# Patient Record
Sex: FEMALE | Race: WHITE | NOT HISPANIC OR LATINO
[De-identification: names, ages, dates, MRNs, and addresses within clinical notes are randomized per-mention and may not be internally consistent; named-entity substitution may affect disease eponyms.]

---

## 2018-02-20 ENCOUNTER — TRANSCRIPTION ENCOUNTER (OUTPATIENT)
Age: 43
End: 2018-02-20

## 2019-11-21 ENCOUNTER — TRANSCRIPTION ENCOUNTER (OUTPATIENT)
Age: 44
End: 2019-11-21

## 2023-03-30 ENCOUNTER — NON-APPOINTMENT (OUTPATIENT)
Age: 48
End: 2023-03-30

## 2023-04-03 ENCOUNTER — APPOINTMENT (OUTPATIENT)
Dept: BREAST CENTER | Facility: CLINIC | Age: 48
End: 2023-04-03
Payer: COMMERCIAL

## 2023-04-03 VITALS
SYSTOLIC BLOOD PRESSURE: 131 MMHG | HEIGHT: 64 IN | WEIGHT: 135 LBS | BODY MASS INDEX: 23.05 KG/M2 | DIASTOLIC BLOOD PRESSURE: 94 MMHG | HEART RATE: 114 BPM

## 2023-04-03 DIAGNOSIS — Z80.0 FAMILY HISTORY OF MALIGNANT NEOPLASM OF DIGESTIVE ORGANS: ICD-10-CM

## 2023-04-03 DIAGNOSIS — Z82.49 FAMILY HISTORY OF ISCHEMIC HEART DISEASE AND OTHER DISEASES OF THE CIRCULATORY SYSTEM: ICD-10-CM

## 2023-04-03 DIAGNOSIS — Z63.5 DISRUPTION OF FAMILY BY SEPARATION AND DIVORCE: ICD-10-CM

## 2023-04-03 DIAGNOSIS — Z82.3 FAMILY HISTORY OF STROKE: ICD-10-CM

## 2023-04-03 DIAGNOSIS — Z82.0 FAMILY HISTORY OF EPILEPSY AND OTHER DISEASES OF THE NERVOUS SYSTEM: ICD-10-CM

## 2023-04-03 DIAGNOSIS — Z98.82 BREAST IMPLANT STATUS: ICD-10-CM

## 2023-04-03 DIAGNOSIS — Z78.9 OTHER SPECIFIED HEALTH STATUS: ICD-10-CM

## 2023-04-03 PROCEDURE — 99204 OFFICE O/P NEW MOD 45 MIN: CPT

## 2023-04-03 SDOH — SOCIAL STABILITY - SOCIAL INSECURITY: DISRUPTION OF FAMILY BY SEPARATION AND DIVORCE: Z63.5

## 2023-04-03 NOTE — HISTORY OF PRESENT ILLNESS
[FreeTextEntry1] : Ms. Morris is a 48 year old woman who presents for a consultation for a rash on the right mastectomy flap, and surveillance for breast cancer. 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 William) with implant based reconstruction (Dr. Stratton) = R breast DCIS, <0.1 cm from the deep margin, 0/1 R sln\par \par 2.) Recurrence of R breast DCIS in 2016 at age 41\par - s/p excision of R mastectomy flap (9/8/16) = DCIS, intermediate to high grade (2 and 4 mm), < 0.1 cm of inked margin from superior flap tissue\par - s/p radiation ([facility])\par - after 2 opinions, no anti-hormonal medication was recommended\par \par A few weeks ago, she developed a rash that was red, tender, with blisters on the right mastectomy flap. She saw a Dermatologist Dr. Hong and is being treated with a 10 day course of Augmentin with improvement of her symptoms. No lumps. It has been a difficult few years and she is now catching up with her doctor visits.\par \par Her BRCA testing is negative. Her family history is not significant for any breast cancer.

## 2023-04-03 NOTE — PAST MEDICAL HISTORY
[Menarche Age ____] : age at menarche was [unfilled] [Approximately ___] : the LMP was approximately [unfilled] [Total Preg ___] : G[unfilled] [Live Births ___] : P[unfilled]  [Age At Live Birth ___] : Age at live birth: [unfilled] [Perimenopausal] : The patient is perimenopausal [History of Hormone Replacement Treatment] : has no history of hormone replacement treatment [FreeTextEntry7] : Five years [FreeTextEntry8] : Ten months

## 2023-04-03 NOTE — PHYSICAL EXAM
[Normocephalic] : normocephalic [Atraumatic] : atraumatic [Sclera nonicteric] : sclera nonicteric [Supple] : supple [No Supraclavicular Adenopathy] : no supraclavicular adenopathy [No Cervical Adenopathy] : no cervical adenopathy [Clear to Auscultation Bilat] : clear to auscultation bilaterally [Normal Sinus Rhythm] : normal sinus rhythm [Examined in the supine and seated position] : examined in the supine and seated position [Asymmetrical] : asymmetrical [No dominant masses] : no dominant masses in right breast  [No dominant masses] : no dominant masses left breast [No Axillary Lymphadenopathy] : no left axillary lymphadenopathy [Soft] : abdomen soft [No Edema] : no edema [No Rashes] : no rashes [No Ulceration] : no ulceration [de-identified] : R [de-identified] : Transverse scar. Implant with contracture. Several scabs and skin lesions; no drainage. No lumps [de-identified] : Transverse scar. Implant

## 2023-04-03 NOTE — CONSULT LETTER
[Dear  ___] : Dear  [unfilled], [Consult Letter:] : I had the pleasure of evaluating your patient, [unfilled]. [Please see my note below.] : Please see my note below. [Consult Closing:] : Thank you very much for allowing me to participate in the care of this patient.  If you have any questions, please do not hesitate to contact me. [Sincerely,] : Sincerely, [FreeTextEntry3] : Faith Estrada MD FACS

## 2023-04-26 ENCOUNTER — NON-APPOINTMENT (OUTPATIENT)
Age: 48
End: 2023-04-26

## 2023-05-01 ENCOUNTER — APPOINTMENT (OUTPATIENT)
Dept: MRI IMAGING | Facility: CLINIC | Age: 48
End: 2023-05-01
Payer: COMMERCIAL

## 2023-05-01 ENCOUNTER — OUTPATIENT (OUTPATIENT)
Dept: OUTPATIENT SERVICES | Facility: HOSPITAL | Age: 48
LOS: 1 days | End: 2023-05-01
Payer: COMMERCIAL

## 2023-05-01 DIAGNOSIS — N64.59 OTHER SIGNS AND SYMPTOMS IN BREAST: ICD-10-CM

## 2023-05-01 DIAGNOSIS — Z98.82 BREAST IMPLANT STATUS: ICD-10-CM

## 2023-05-01 PROCEDURE — C8908: CPT

## 2023-05-01 PROCEDURE — A9585: CPT

## 2023-05-01 PROCEDURE — 77049 MRI BREAST C-+ W/CAD BI: CPT | Mod: 26

## 2023-05-01 PROCEDURE — C8937: CPT

## 2023-05-02 ENCOUNTER — TRANSCRIPTION ENCOUNTER (OUTPATIENT)
Age: 48
End: 2023-05-02

## 2023-05-09 ENCOUNTER — LABORATORY RESULT (OUTPATIENT)
Age: 48
End: 2023-05-09

## 2023-05-09 ENCOUNTER — APPOINTMENT (OUTPATIENT)
Dept: BREAST CENTER | Facility: CLINIC | Age: 48
End: 2023-05-09
Payer: COMMERCIAL

## 2023-05-09 ENCOUNTER — RESULT REVIEW (OUTPATIENT)
Age: 48
End: 2023-05-09

## 2023-05-09 VITALS
DIASTOLIC BLOOD PRESSURE: 92 MMHG | WEIGHT: 135 LBS | HEART RATE: 118 BPM | BODY MASS INDEX: 23.05 KG/M2 | SYSTOLIC BLOOD PRESSURE: 138 MMHG | HEIGHT: 64 IN

## 2023-05-09 DIAGNOSIS — R92.8 OTHER ABNORMAL AND INCONCLUSIVE FINDINGS ON DIAGNOSTIC IMAGING OF BREAST: ICD-10-CM

## 2023-05-09 PROCEDURE — 11104 PUNCH BX SKIN SINGLE LESION: CPT

## 2023-05-09 RX ORDER — MUPIROCIN 20 MG/G
2 OINTMENT TOPICAL
Refills: 0 | Status: DISCONTINUED | COMMUNITY
End: 2023-05-09

## 2023-05-09 RX ORDER — AMOXICILLIN AND CLAVULANATE POTASSIUM 500; 125 MG/1; 1/1
500-125 TABLET, FILM COATED ORAL
Refills: 0 | Status: DISCONTINUED | COMMUNITY
End: 2023-05-09

## 2023-05-09 RX ORDER — AZELAIC ACID 0.15 G/G
15 GEL TOPICAL
Refills: 0 | Status: DISCONTINUED | COMMUNITY
End: 2023-05-09

## 2023-05-09 NOTE — PROCEDURE
[FreeTextEntry1] : Punch biopsy of right mastectomy flap [FreeTextEntry2] : Persistent rash on right mastectomy flap with abnormal enhancement on MRI [FreeTextEntry3] : The procedure was reviewed along with the benefits and risks which include damage to the underlying implant. Consent was obtained. The patient was prepped with betadine and sterilely draped. 1% lidocaine was injected for local anesthesia to a lesion at 9:00 where the mastectomy flap was thickest. The mastectomy flap was pinched to lift it from the implant, and a 3 mm punch was used to obtain a circular piece of skin. This was placed in formalin and sent to pathology. The flap was again pinched to place a single 4-0 nylon suture for wound closure. Hemostasis was ensured. The site was dressed with a sterile gauze and a tegaderm. The patient tolerated the procedure.\par \par I will call her when the results are back.

## 2023-05-10 ENCOUNTER — APPOINTMENT (OUTPATIENT)
Dept: MRI IMAGING | Facility: CLINIC | Age: 48
End: 2023-05-10
Payer: COMMERCIAL

## 2023-05-10 ENCOUNTER — OUTPATIENT (OUTPATIENT)
Dept: OUTPATIENT SERVICES | Facility: HOSPITAL | Age: 48
LOS: 1 days | End: 2023-05-10
Payer: COMMERCIAL

## 2023-05-10 DIAGNOSIS — R92.8 OTHER ABNORMAL AND INCONCLUSIVE FINDINGS ON DIAGNOSTIC IMAGING OF BREAST: ICD-10-CM

## 2023-05-10 PROCEDURE — 72197 MRI PELVIS W/O & W/DYE: CPT | Mod: 26

## 2023-05-10 PROCEDURE — A9585: CPT

## 2023-05-10 PROCEDURE — 74183 MRI ABD W/O CNTR FLWD CNTR: CPT | Mod: 26

## 2023-05-10 PROCEDURE — 74183 MRI ABD W/O CNTR FLWD CNTR: CPT

## 2023-05-10 PROCEDURE — 72197 MRI PELVIS W/O & W/DYE: CPT

## 2023-05-11 ENCOUNTER — OUTPATIENT (OUTPATIENT)
Dept: OUTPATIENT SERVICES | Facility: HOSPITAL | Age: 48
LOS: 1 days | Discharge: HOME | End: 2023-05-11
Payer: COMMERCIAL

## 2023-05-11 DIAGNOSIS — C50.919 MALIGNANT NEOPLASM OF UNSPECIFIED SITE OF UNSPECIFIED FEMALE BREAST: ICD-10-CM

## 2023-05-12 ENCOUNTER — APPOINTMENT (OUTPATIENT)
Dept: HEMATOLOGY ONCOLOGY | Facility: CLINIC | Age: 48
End: 2023-05-12
Payer: COMMERCIAL

## 2023-05-12 ENCOUNTER — RESULT REVIEW (OUTPATIENT)
Age: 48
End: 2023-05-12

## 2023-05-12 ENCOUNTER — NON-APPOINTMENT (OUTPATIENT)
Age: 48
End: 2023-05-12

## 2023-05-12 VITALS
HEART RATE: 122 BPM | WEIGHT: 132.28 LBS | RESPIRATION RATE: 120 BRPM | SYSTOLIC BLOOD PRESSURE: 141 MMHG | HEIGHT: 62.01 IN | BODY MASS INDEX: 24.03 KG/M2 | TEMPERATURE: 97.2 F | OXYGEN SATURATION: 95 % | DIASTOLIC BLOOD PRESSURE: 92 MMHG

## 2023-05-12 DIAGNOSIS — Z08 ENCOUNTER FOR FOLLOW-UP EXAMINATION AFTER COMPLETED TREATMENT FOR MALIGNANT NEOPLASM: ICD-10-CM

## 2023-05-12 DIAGNOSIS — Z78.9 OTHER SPECIFIED HEALTH STATUS: ICD-10-CM

## 2023-05-12 DIAGNOSIS — Z85.3 PERSONAL HISTORY OF MALIGNANT NEOPLASM OF BREAST: ICD-10-CM

## 2023-05-12 DIAGNOSIS — F41.8 OTHER SPECIFIED ANXIETY DISORDERS: ICD-10-CM

## 2023-05-12 DIAGNOSIS — Z85.3 ENCOUNTER FOR FOLLOW-UP EXAMINATION AFTER COMPLETED TREATMENT FOR MALIGNANT NEOPLASM: ICD-10-CM

## 2023-05-12 LAB
ALBUMIN SERPL ELPH-MCNC: 4.8 G/DL
ALP BLD-CCNC: 145 U/L
ALT SERPL-CCNC: 14 U/L
ANION GAP SERPL CALC-SCNC: 19 MMOL/L
AST SERPL-CCNC: 40 U/L
BASOPHILS # BLD AUTO: 0.06 K/UL — SIGNIFICANT CHANGE UP (ref 0–0.2)
BASOPHILS NFR BLD AUTO: 0.8 % — SIGNIFICANT CHANGE UP (ref 0–2)
BILIRUB SERPL-MCNC: 0.4 MG/DL
BUN SERPL-MCNC: 10 MG/DL
CALCIUM SERPL-MCNC: 9.9 MG/DL
CANCER AG15-3 SERPL-ACNC: 430 U/ML
CEA SERPL-MCNC: 9.8 NG/ML
CHLORIDE SERPL-SCNC: 98 MMOL/L
CO2 SERPL-SCNC: 22 MMOL/L
CREAT SERPL-MCNC: 0.69 MG/DL
EGFR: 106 ML/MIN/1.73M2
EOSINOPHIL # BLD AUTO: 0.02 K/UL — SIGNIFICANT CHANGE UP (ref 0–0.5)
EOSINOPHIL NFR BLD AUTO: 0.3 % — SIGNIFICANT CHANGE UP (ref 0–6)
ESTRADIOL SERPL-MCNC: 100 PG/ML
FSH SERPL-MCNC: 21.2 IU/L
GLUCOSE SERPL-MCNC: 94 MG/DL
HCT VFR BLD CALC: 39.8 % — SIGNIFICANT CHANGE UP (ref 34.5–45)
HGB BLD-MCNC: 13.1 G/DL — SIGNIFICANT CHANGE UP (ref 11.5–15.5)
IMM GRANULOCYTES NFR BLD AUTO: 0.4 % — SIGNIFICANT CHANGE UP (ref 0–0.9)
INR PPP: 1.11 RATIO
LYMPHOCYTES # BLD AUTO: 0.94 K/UL — LOW (ref 1–3.3)
LYMPHOCYTES # BLD AUTO: 12.1 % — LOW (ref 13–44)
MCHC RBC-ENTMCNC: 27.9 PG — SIGNIFICANT CHANGE UP (ref 27–34)
MCHC RBC-ENTMCNC: 32.9 G/DL — SIGNIFICANT CHANGE UP (ref 32–36)
MCV RBC AUTO: 84.9 FL — SIGNIFICANT CHANGE UP (ref 80–100)
MONOCYTES # BLD AUTO: 0.49 K/UL — SIGNIFICANT CHANGE UP (ref 0–0.9)
MONOCYTES NFR BLD AUTO: 6.3 % — SIGNIFICANT CHANGE UP (ref 2–14)
NEUTROPHILS # BLD AUTO: 6.21 K/UL — SIGNIFICANT CHANGE UP (ref 1.8–7.4)
NEUTROPHILS NFR BLD AUTO: 80.1 % — HIGH (ref 43–77)
NRBC # BLD: 0 /100 WBCS — SIGNIFICANT CHANGE UP (ref 0–0)
PLATELET # BLD AUTO: 223 K/UL — SIGNIFICANT CHANGE UP (ref 150–400)
POTASSIUM SERPL-SCNC: 3.9 MMOL/L
PROT SERPL-MCNC: 7.7 G/DL
PT BLD: 12.9 SEC
RBC # BLD: 4.69 M/UL — SIGNIFICANT CHANGE UP (ref 3.8–5.2)
RBC # FLD: 12.6 % — SIGNIFICANT CHANGE UP (ref 10.3–14.5)
SODIUM SERPL-SCNC: 139 MMOL/L
WBC # BLD: 7.75 K/UL — SIGNIFICANT CHANGE UP (ref 3.8–10.5)
WBC # FLD AUTO: 7.75 K/UL — SIGNIFICANT CHANGE UP (ref 3.8–10.5)

## 2023-05-12 PROCEDURE — 93010 ELECTROCARDIOGRAM REPORT: CPT

## 2023-05-12 PROCEDURE — 99245 OFF/OP CONSLTJ NEW/EST HI 55: CPT

## 2023-05-12 RX ORDER — ALPRAZOLAM 0.25 MG/1
0.25 TABLET ORAL
Qty: 30 | Refills: 0 | Status: ACTIVE | COMMUNITY
Start: 2023-05-12 | End: 1900-01-01

## 2023-05-12 NOTE — CONSULT LETTER
[Consult Letter:] : I had the pleasure of evaluating your patient, [unfilled]. [Please see my note below.] : Please see my note below. [Consult Closing:] : Thank you very much for allowing me to participate in the care of this patient.  If you have any questions, please do not hesitate to contact me. [Sincerely,] : Sincerely, [DrAna Lilia  ___] : Dr. VILA [DrAna Lilia ___] : Dr. VILA [FreeTextEntry2] : Faith Estrada MD\par [address]\par [place] [phone] [FreeTextEntry3] : Niesha Barnhart MD\par Associate Chief \par [facility]\par [facility]\par  of Medicine\par [employer] [facility] [employer]\par \par

## 2023-05-12 NOTE — PHYSICAL EXAM
[Fully active, able to carry on all pre-disease performance without restriction] : Status 0 - Fully active, able to carry on all pre-disease performance without restriction [Normal] : affect appropriate [de-identified] : s/p Bilateral mastectomies with implant reconstruction. No chest wall masses or lymphadenopathy. Healing skin lesions with no discrete masses

## 2023-05-12 NOTE — REASON FOR VISIT
[Initial Consultation] : an initial consultation [Friend] : friend [Spouse] : spouse [FreeTextEntry2] : Metastatic breast cancer

## 2023-05-12 NOTE — HISTORY OF PRESENT ILLNESS
[Disease: _____________________] : Disease: [unfilled] [AJCC Stage: ____] : AJCC Stage: [unfilled] [T: ___] : T[unfilled] [N: ___] : N[unfilled] [M: ___] : M[unfilled] [de-identified] : Metastatic breast cancer at age 48.\par Right DCIS diagnosed at age 37 with chest wall recurrence at age 41.\par 3/9/12 Right breast biopsy revealed DCIS, low nuclear grade, ER/[place] >90%.\par 4/12/12 Bilateral mastectomies with SLN by Dr. Carla William at [facility] revealed DCIS, intermediate to high nuclear grade, with DCIS present <0.1 cm from the deep margin, 0/3 left SLN and 0/1 right SLN. She had immediate implant based reconstruction by Dr. Stratton.\par 9/8/16 Resection of Right chest wall focal high grade DCIS (2 and 4 mm) recurrence with negative margins, although <0.1 cm from inked margin of superior flap tissue\par 10/2016 Adjuvant chest wall radiation at [facility].\par 3/23 She developed a rash with blisters on the right mastectomy flap. She was treated with a 10 day course of Augmentin with improvement.\par 4/3/23 Genetic testing with [employer] 47 gene panel negative for pathogenic variants and VUS.\par 5/1/23 MRI breasts revealed multiple foci of dermal enhancement in the right lateral chest wall corresponding to the area of clinical concern. Innumerable hepatic lesions.\par 5/9/23 Biopsy of chest wall lesion by Dr. Estrada with pathology pending\par 5/10/23 MRI of the abdomen revealed extensive liver metastases and bone metastases in the lumbar spine, pelvis and proximal femurs as well as a left adrenal lesion, consistent with metastatic disease. [de-identified] : 2012: DCIS, high grade, ER/[place] >90% [de-identified] : Fatimah comes with her close friend to discuss the medical management of her newly diagnosed metastatic breast cancer. The chest wall lesions improved on Augmentin but recently she has been having a lot of headaches and intermittent pain in her back and pelvis. She is also very anxious and is not sleeping well and has lost 5 pounds this week.\par She is  with two children ages 17 and 15. She and her ex  Dc are still close friends and he accompanies her today along with her friend.\par \par HEALTH MAINTENANCE:\par PCP: CARLOS Pedro with Dr. Modesto Piper\par GYN: Dr. Philip Schoenfeld\par Mammogram: s/p bilateral mastectomies\par Pap Smear: 4/23 negative\par Colonoscopy: none\par Genetics: 4/23 [employer] 47 gene panel negative for pathogenic variants and VUS.\par

## 2023-05-13 ENCOUNTER — APPOINTMENT (OUTPATIENT)
Dept: MRI IMAGING | Facility: CLINIC | Age: 48
End: 2023-05-13
Payer: COMMERCIAL

## 2023-05-13 ENCOUNTER — OUTPATIENT (OUTPATIENT)
Dept: OUTPATIENT SERVICES | Facility: HOSPITAL | Age: 48
LOS: 1 days | End: 2023-05-13
Payer: COMMERCIAL

## 2023-05-13 DIAGNOSIS — C50.919 MALIGNANT NEOPLASM OF UNSPECIFIED SITE OF UNSPECIFIED FEMALE BREAST: ICD-10-CM

## 2023-05-13 PROCEDURE — A9585: CPT

## 2023-05-13 PROCEDURE — 70553 MRI BRAIN STEM W/O & W/DYE: CPT | Mod: 26

## 2023-05-13 PROCEDURE — 70553 MRI BRAIN STEM W/O & W/DYE: CPT

## 2023-05-15 ENCOUNTER — APPOINTMENT (OUTPATIENT)
Dept: NUCLEAR MEDICINE | Facility: IMAGING CENTER | Age: 48
End: 2023-05-15
Payer: COMMERCIAL

## 2023-05-15 ENCOUNTER — APPOINTMENT (OUTPATIENT)
Dept: HEMATOLOGY ONCOLOGY | Facility: CLINIC | Age: 48
End: 2023-05-15

## 2023-05-15 ENCOUNTER — OUTPATIENT (OUTPATIENT)
Dept: OUTPATIENT SERVICES | Facility: HOSPITAL | Age: 48
LOS: 1 days | End: 2023-05-15
Payer: COMMERCIAL

## 2023-05-15 ENCOUNTER — RESULT REVIEW (OUTPATIENT)
Age: 48
End: 2023-05-15

## 2023-05-15 ENCOUNTER — LABORATORY RESULT (OUTPATIENT)
Age: 48
End: 2023-05-15

## 2023-05-15 DIAGNOSIS — C50.919 MALIGNANT NEOPLASM OF UNSPECIFIED SITE OF UNSPECIFIED FEMALE BREAST: ICD-10-CM

## 2023-05-15 LAB — CANCER AG27-29 SERPL-ACNC: 292.2 U/ML

## 2023-05-15 PROCEDURE — 78306 BONE IMAGING WHOLE BODY: CPT | Mod: 26

## 2023-05-15 PROCEDURE — 78830 RP LOCLZJ TUM SPECT W/CT 1: CPT | Mod: 26

## 2023-05-15 PROCEDURE — 78306 BONE IMAGING WHOLE BODY: CPT

## 2023-05-15 PROCEDURE — 78830 RP LOCLZJ TUM SPECT W/CT 1: CPT

## 2023-05-15 PROCEDURE — A9561: CPT

## 2023-05-16 ENCOUNTER — RESULT REVIEW (OUTPATIENT)
Age: 48
End: 2023-05-16

## 2023-05-16 ENCOUNTER — OUTPATIENT (OUTPATIENT)
Dept: OUTPATIENT SERVICES | Facility: HOSPITAL | Age: 48
LOS: 1 days | End: 2023-05-16
Payer: COMMERCIAL

## 2023-05-16 ENCOUNTER — APPOINTMENT (OUTPATIENT)
Dept: ULTRASOUND IMAGING | Facility: IMAGING CENTER | Age: 48
End: 2023-05-16
Payer: COMMERCIAL

## 2023-05-16 DIAGNOSIS — Z00.8 ENCOUNTER FOR OTHER GENERAL EXAMINATION: ICD-10-CM

## 2023-05-16 DIAGNOSIS — C50.919 MALIGNANT NEOPLASM OF UNSPECIFIED SITE OF UNSPECIFIED FEMALE BREAST: ICD-10-CM

## 2023-05-16 PROCEDURE — 88360 TUMOR IMMUNOHISTOCHEM/MANUAL: CPT | Mod: 26

## 2023-05-16 PROCEDURE — 88307 TISSUE EXAM BY PATHOLOGIST: CPT | Mod: 26

## 2023-05-16 PROCEDURE — 88360 TUMOR IMMUNOHISTOCHEM/MANUAL: CPT

## 2023-05-16 PROCEDURE — 88307 TISSUE EXAM BY PATHOLOGIST: CPT

## 2023-05-16 PROCEDURE — 76942 ECHO GUIDE FOR BIOPSY: CPT | Mod: 26

## 2023-05-16 PROCEDURE — 47000 NEEDLE BIOPSY OF LIVER PERQ: CPT

## 2023-05-16 PROCEDURE — 76942 ECHO GUIDE FOR BIOPSY: CPT

## 2023-05-16 PROCEDURE — 88342 IMHCHEM/IMCYTCHM 1ST ANTB: CPT | Mod: 26,59

## 2023-05-16 PROCEDURE — 88341 IMHCHEM/IMCYTCHM EA ADD ANTB: CPT

## 2023-05-16 RX ORDER — CELECOXIB 100 MG/1
100 CAPSULE ORAL
Qty: 30 | Refills: 5 | Status: DISCONTINUED | COMMUNITY
Start: 2023-05-12 | End: 2023-05-16

## 2023-05-18 ENCOUNTER — APPOINTMENT (OUTPATIENT)
Dept: INFUSION THERAPY | Facility: HOSPITAL | Age: 48
End: 2023-05-18

## 2023-05-18 ENCOUNTER — NON-APPOINTMENT (OUTPATIENT)
Age: 48
End: 2023-05-18

## 2023-05-18 DIAGNOSIS — C79.51 SECONDARY MALIGNANT NEOPLASM OF BONE: ICD-10-CM

## 2023-05-19 ENCOUNTER — APPOINTMENT (OUTPATIENT)
Dept: BREAST CENTER | Facility: CLINIC | Age: 48
End: 2023-05-19
Payer: COMMERCIAL

## 2023-05-19 VITALS
HEART RATE: 89 BPM | SYSTOLIC BLOOD PRESSURE: 115 MMHG | DIASTOLIC BLOOD PRESSURE: 83 MMHG | HEIGHT: 64 IN | BODY MASS INDEX: 23.05 KG/M2 | WEIGHT: 135 LBS

## 2023-05-19 LAB — SURGICAL PATHOLOGY STUDY: SIGNIFICANT CHANGE UP

## 2023-05-19 PROCEDURE — 99211 OFF/OP EST MAY X REQ PHY/QHP: CPT

## 2023-05-19 NOTE — HISTORY OF PRESENT ILLNESS
[FreeTextEntry1] : This is a 48 year old woman who is ten days s/p punch biopsy of right mastectomy flap (Dr. Estrada).  She is here for stitch removal.  She denies any fevers, chills, redness  or other complaints at the site of biopsy.  She underwent a liver biopsy three days ago and is awaiting results.

## 2023-05-19 NOTE — DATA REVIEWED
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 Estrada  over the phone on 5/18/23).

## 2023-05-19 NOTE — PHYSICAL EXAM
[de-identified] : nylon stitch in place @ 9:00 - stitch removed without difficulty . Area without erythema.  bacitracin placed to site

## 2023-05-23 PROBLEM — G89.3 CANCER ASSOCIATED PAIN: Status: RESOLVED | Noted: 2023-05-12 | Resolved: 2023-05-23

## 2023-05-23 RX ORDER — LEUPROLIDE ACETATE 11.25 MG
KIT INTRAMUSCULAR
Refills: 0 | Status: ACTIVE | COMMUNITY

## 2023-05-24 ENCOUNTER — OUTPATIENT (OUTPATIENT)
Dept: OUTPATIENT SERVICES | Facility: HOSPITAL | Age: 48
LOS: 1 days | End: 2023-05-24
Payer: COMMERCIAL

## 2023-05-24 ENCOUNTER — APPOINTMENT (OUTPATIENT)
Dept: NUCLEAR MEDICINE | Facility: CLINIC | Age: 48
End: 2023-05-24
Payer: COMMERCIAL

## 2023-05-24 ENCOUNTER — APPOINTMENT (OUTPATIENT)
Dept: HEMATOLOGY ONCOLOGY | Facility: CLINIC | Age: 48
End: 2023-05-24
Payer: COMMERCIAL

## 2023-05-24 VITALS
BODY MASS INDEX: 22.82 KG/M2 | WEIGHT: 132.94 LBS | HEART RATE: 93 BPM | DIASTOLIC BLOOD PRESSURE: 82 MMHG | OXYGEN SATURATION: 96 % | SYSTOLIC BLOOD PRESSURE: 123 MMHG | TEMPERATURE: 97.2 F | RESPIRATION RATE: 17 BRPM

## 2023-05-24 DIAGNOSIS — G89.3 NEOPLASM RELATED PAIN (ACUTE) (CHRONIC): ICD-10-CM

## 2023-05-24 DIAGNOSIS — N64.59 OTHER SIGNS AND SYMPTOMS IN BREAST: ICD-10-CM

## 2023-05-24 DIAGNOSIS — Z85.3 PERSONAL HISTORY OF MALIGNANT NEOPLASM OF BREAST: ICD-10-CM

## 2023-05-24 DIAGNOSIS — R51.9 HEADACHE, UNSPECIFIED: ICD-10-CM

## 2023-05-24 PROCEDURE — 78815 PET IMAGE W/CT SKULL-THIGH: CPT

## 2023-05-24 PROCEDURE — A9552: CPT

## 2023-05-24 PROCEDURE — 78815 PET IMAGE W/CT SKULL-THIGH: CPT | Mod: 26,PI

## 2023-05-24 PROCEDURE — 99215 OFFICE O/P EST HI 40 MIN: CPT

## 2023-06-05 ENCOUNTER — LABORATORY RESULT (OUTPATIENT)
Age: 48
End: 2023-06-05

## 2023-06-05 NOTE — HISTORY OF PRESENT ILLNESS
[Disease: _____________________] : Disease: [unfilled] [T: ___] : T[unfilled] [N: ___] : N[unfilled] [M: ___] : M[unfilled] [AJCC Stage: ____] : AJCC Stage: [unfilled] [de-identified] : Metastatic breast cancer at age 48.\par Right DCIS diagnosed at age 37 with chest wall recurrence at age 41.\par 3/9/12 Right breast biopsy revealed DCIS, low nuclear grade, ER/[place] >90%.\par 4/12/12 Bilateral mastectomies with SLN by Dr. Carla William at [facility] revealed DCIS, intermediate to high nuclear grade, with DCIS present <0.1 cm from the deep margin, 0/3 left SLN and 0/1 right SLN. She had immediate implant based reconstruction by Dr. Stratton.\par 9/8/16 Resection of Right chest wall focal high grade DCIS (2 and 4 mm) recurrence with negative margins, although <0.1 cm from inked margin of superior flap tissue\par 10/2016 Adjuvant chest wall radiation at [facility].\par 3/23 She developed a rash with blisters on the right mastectomy flap. She was treated with a 10 day course of Augmentin with improvement.\par 4/3/23 Genetic testing with [employer] 47 gene panel negative for pathogenic variants and VUS.\par 5/1/23 MRI breasts revealed multiple foci of dermal enhancement in the right lateral chest wall corresponding to the area of clinical concern. Innumerable hepatic lesions.\par 5/9/23 Biopsy of chest wall lesion by Dr. Estrada showed no carcinoma and only dermal sclerosis with superficial fibroblasts and scattered telangiectases consistent with radiation dermatitis changes.\par 5/10/23 MRI of the abdomen revealed extensive liver metastases and bone metastases in the lumbar spine, pelvis and proximal femurs as well as a left adrenal lesion, consistent with metastatic disease.\par 5/13/23 MRI brain showed no abnormal enhancement and no abnormal findings\par 5/15/23 Bone scan showed focal increased uptake in the right acetabulum and left iliac wing suggestive of osseous metastases and punctate foci in the left parietal calvarium and left side of T10 vertebra which are nonspecific, but possibly represent additional metastatic foci.\par 05/16/23 US guided Liver biopsy revealed metastatic carcinoma, morphologically and immunophenotypically compatible with breast primary, ER 90%, [place] 80%, HER-2 (1+)\par 5/18/23 Lupron 11.75 mg given and will continue every 3 months\par 5/18/23 Xgeva 120 mg given and will continue  every 3 months\par 5/23 Letrozole and Ibrance 125 mg po daily days 1-21 every 28 days [de-identified] : 2023 IDC, ER 90%, [place] 80%, HER-2 (1+); 2012: DCIS, high grade, ER/[place] >90% [de-identified] : Fatimah started treatment on 5/18 with Lupron and Xgeva. She tolerated both well with some mild hot flashes and no increase in pain.\par She had a liver biopsy on 5/16/23 which showed IDC, ER 90%, [place] 80%, HER-2 negative.\par She had her MRI brain on 5/13 which was negative and bone scan on 5/12 which showed limited metastatic disease. She is scheduled for PET scan on 5/24/23.\par The chest wall lesions improved on Augmentin and the punch biopsy showed no carcinoma and only dermal sclerosis with superficial fibroblasts and scattered telangiectases consistent with radiation dermatitis changes.\par She continues to have aches and pains. She often takes ibuprofen 800 mg but that is usually for her headaches which occur every few days.\par \par HEALTH MAINTENANCE:\par PCP: CARLOS Pedro with Dr. Modesto Piper\par GYN: Dr. Philip Schoenfeld\par Mammogram: s/p bilateral mastectomies\par Pap Smear: 4/23 negative\par Colonoscopy: none\par Genetics: 4/23 [employer]ae 47 gene panel negative for pathogenic variants and VUS.\par

## 2023-06-05 NOTE — REASON FOR VISIT
[Spouse] : spouse [Friend] : friend [Follow-Up Visit] : a follow-up [FreeTextEntry2] : Metastatic breast cancer

## 2023-06-05 NOTE — PHYSICAL EXAM
[Fully active, able to carry on all pre-disease performance without restriction] : Status 0 - Fully active, able to carry on all pre-disease performance without restriction [Normal] : affect appropriate [de-identified] : s/p Bilateral mastectomies with implant reconstruction. No chest wall masses or lymphadenopathy. Healing skin lesions with no discrete masses

## 2023-06-08 ENCOUNTER — APPOINTMENT (OUTPATIENT)
Dept: HEMATOLOGY ONCOLOGY | Facility: CLINIC | Age: 48
End: 2023-06-08
Payer: COMMERCIAL

## 2023-06-08 VITALS
WEIGHT: 130.07 LBS | BODY MASS INDEX: 22.33 KG/M2 | HEART RATE: 111 BPM | SYSTOLIC BLOOD PRESSURE: 130 MMHG | RESPIRATION RATE: 17 BRPM | OXYGEN SATURATION: 98 % | DIASTOLIC BLOOD PRESSURE: 87 MMHG | TEMPERATURE: 97 F

## 2023-06-08 PROCEDURE — 99215 OFFICE O/P EST HI 40 MIN: CPT

## 2023-06-08 RX ORDER — BETAMETHASONE DIPROPIONATE 0.5 MG/G
0.05 OINTMENT, AUGMENTED TOPICAL TWICE DAILY
Qty: 1 | Refills: 0 | Status: ACTIVE | COMMUNITY
Start: 2023-06-08 | End: 1900-01-01

## 2023-06-08 NOTE — PHYSICAL EXAM
[Fully active, able to carry on all pre-disease performance without restriction] : Status 0 - Fully active, able to carry on all pre-disease performance without restriction [Normal] : affect appropriate [de-identified] : s/p Bilateral mastectomies with implant reconstruction. No chest wall masses or lymphadenopathy. Healing skin lesions with no discrete masses [de-identified] : Resolving inflammation right breast. Scattered isolated erythematous lesions on neck and trunk with excoriation

## 2023-06-08 NOTE — HISTORY OF PRESENT ILLNESS
[Disease: _____________________] : Disease: [unfilled] [T: ___] : T[unfilled] [N: ___] : N[unfilled] [M: ___] : M[unfilled] [AJCC Stage: ____] : AJCC Stage: [unfilled] [de-identified] : Metastatic breast cancer at age 48.\par Right DCIS diagnosed at age 37 with chest wall recurrence at age 41.\par 3/9/12 Right breast biopsy revealed DCIS, low nuclear grade, ER/[place] >90%.\par 4/12/12 Bilateral mastectomies with SLN by Dr. Carla William at [facility] revealed DCIS, intermediate to high nuclear grade, with DCIS present <0.1 cm from the deep margin, 0/3 left SLN and 0/1 right SLN. She had immediate implant based reconstruction by Dr. Stratton.\par 9/8/16 Resection of Right chest wall focal high grade DCIS (2 and 4 mm) recurrence with negative margins, although <0.1 cm from inked margin of superior flap tissue\par 10/2016 Adjuvant chest wall radiation at [facility].\par 3/23 She developed a rash with blisters on the right mastectomy flap. She was treated with a 10 day course of Augmentin with improvement.\par 4/3/23 Genetic testing with [employer] 47 gene panel negative for pathogenic variants and VUS.\par 5/1/23 MRI breasts revealed multiple foci of dermal enhancement in the right lateral chest wall corresponding to the area of clinical concern. Innumerable hepatic lesions.\par 5/9/23 Biopsy of chest wall lesion by Dr. Estrada showed no carcinoma and only dermal sclerosis with superficial fibroblasts and scattered telangiectases consistent with radiation dermatitis changes.\par 5/10/23 MRI of the abdomen revealed extensive liver metastases and bone metastases in the lumbar spine, pelvis and proximal femurs as well as a left adrenal lesion, consistent with metastatic disease.\par 5/13/23 MRI brain showed no abnormal enhancement and no abnormal findings\par 5/15/23 Bone scan showed focal increased uptake in the right acetabulum and left iliac wing suggestive of osseous metastases and punctate foci in the left parietal calvarium and left side of T10 vertebra which are nonspecific, but possibly represent additional metastatic foci.\par 05/16/23 US guided Liver biopsy revealed metastatic carcinoma, morphologically and immunophenotypically compatible with breast primary, ER 90%, [place] 80%, HER-2 (1+)\par 5/18/23 Lupron 11.75 mg given and will continue every 3 months\par 5/18/23 Xgeva 120 mg given and will continue  every 3 months\par 5/27/23 Letrozole and Ibrance 125 mg po daily days 1-21 every 28 days\par 5/24/23 PET/CT scan showed FDG avid bone lesions (T9, left iliac wing, right iliac bone, left femur, no cortical defect) and hepatic lesions (scattered with largest 3.7 x 3.4 cm) c/w metastases and multiple subcentimeter lung nodules in a metastatic pattern with a few showing FDG activity.\par  [de-identified] : 2023 IDC, ER 90%, [place] 80%, HER-2 (1+); 2012: DCIS, high grade, ER/[place] >90% [de-identified] : Fatimah started treatment on 5/18 with Lupron and Xgeva. She tolerated both well with some mild hot flashes and no increase in pain.\par She started letrozole and Ibrance on 5/27. She is overall tolerating the medication fairly well with no adverse effects at this time. \par CBC on 6/5/23 shows WBC 3.5 with ANC 2.43 which is expected.\par The lesions on the right reconstructed breast improved on Augmentin and the punch biopsy showed no carcinoma and only dermal sclerosis with superficial fibroblasts and scattered telangiectases consistent with radiation dermatitis changes. However she continues to develop pruritic scattered lesions on her neck and chest and back of unclear etiology.\par She continues to have aches and pains. Recently her left hip was hurting and she was worried it was from the cancer. Reassured that no cortical thinning noted.\par She takes ibuprofen 800 mg as needed for her headaches which occur every few days.\par \par 5/24/23 PET/CT scan showed FDG avid bone lesions (T9, left iliac wing, right iliac bone, left femur, no cortical defect) and hepatic lesions (scattered with largest 3.7 x 3.4 cm) c/w metastases and multiple subcentimeter lung nodules in a metastatic pattern with a few showing FDG activity.\par 5/15/23 Bone scan showed focal increased uptake in the right acetabulum and left iliac wing suggestive of osseous metastases and punctate foci in the left parietal calvarium and left side of T10 vertebra which are nonspecific, but possibly represent additional metastatic foci.\par \par

## 2023-06-09 RX ORDER — PALBOCICLIB 125 MG/1
125 TABLET, FILM COATED ORAL
Qty: 21 | Refills: 2 | Status: ACTIVE | COMMUNITY
Start: 2023-05-24 | End: 1900-01-01

## 2023-06-19 ENCOUNTER — LABORATORY RESULT (OUTPATIENT)
Age: 48
End: 2023-06-19

## 2023-06-20 LAB
ALBUMIN SERPL ELPH-MCNC: 4.4 G/DL
ALP BLD-CCNC: 76 U/L
ALT SERPL-CCNC: 12 U/L
ANION GAP SERPL CALC-SCNC: 15 MMOL/L
AST SERPL-CCNC: 33 U/L
BILIRUB SERPL-MCNC: 0.3 MG/DL
BUN SERPL-MCNC: 11 MG/DL
CALCIUM SERPL-MCNC: 9.3 MG/DL
CANCER AG27-29 SERPL-ACNC: 361.6 U/ML
CEA SERPL-MCNC: 10 NG/ML
CHLORIDE SERPL-SCNC: 101 MMOL/L
CO2 SERPL-SCNC: 22 MMOL/L
CREAT SERPL-MCNC: 0.75 MG/DL
EGFR: 98 ML/MIN/1.73M2
GLUCOSE SERPL-MCNC: 84 MG/DL
POTASSIUM SERPL-SCNC: 4 MMOL/L
PROT SERPL-MCNC: 6.6 G/DL
SODIUM SERPL-SCNC: 139 MMOL/L

## 2023-06-21 ENCOUNTER — APPOINTMENT (OUTPATIENT)
Dept: HEMATOLOGY ONCOLOGY | Facility: CLINIC | Age: 48
End: 2023-06-21
Payer: COMMERCIAL

## 2023-06-21 VITALS
RESPIRATION RATE: 21 BRPM | HEART RATE: 129 BPM | WEIGHT: 127.87 LBS | TEMPERATURE: 97.1 F | SYSTOLIC BLOOD PRESSURE: 119 MMHG | OXYGEN SATURATION: 98 % | BODY MASS INDEX: 21.95 KG/M2 | DIASTOLIC BLOOD PRESSURE: 81 MMHG

## 2023-06-21 PROCEDURE — 99214 OFFICE O/P EST MOD 30 MIN: CPT

## 2023-06-21 NOTE — PHYSICAL EXAM
[Fully active, able to carry on all pre-disease performance without restriction] : Status 0 - Fully active, able to carry on all pre-disease performance without restriction [Normal] : affect appropriate [de-identified] : s/p Bilateral mastectomies with implant reconstruction. No chest wall masses or lymphadenopathy. Healing skin lesions with no discrete masses [de-identified] : Resolving inflammation right breast. Scattered isolated erythematous lesions on neck and trunk with excoriation

## 2023-06-21 NOTE — HISTORY OF PRESENT ILLNESS
[Disease: _____________________] : Disease: [unfilled] [T: ___] : T[unfilled] [N: ___] : N[unfilled] [M: ___] : M[unfilled] [AJCC Stage: ____] : AJCC Stage: [unfilled] [de-identified] : Metastatic breast cancer at age 48.\par Right DCIS diagnosed at age 37 with chest wall recurrence at age 41.\par 3/9/12 Right breast biopsy revealed DCIS, low nuclear grade, ER/[place] >90%.\par 4/12/12 Bilateral mastectomies with SLN by Dr. Carla William at [facility] revealed DCIS, intermediate to high nuclear grade, with DCIS present <0.1 cm from the deep margin, 0/3 left SLN and 0/1 right SLN. She had immediate implant based reconstruction by Dr. Stratton.\par 9/8/16 Resection of Right chest wall focal high grade DCIS (2 and 4 mm) recurrence with negative margins, although <0.1 cm from inked margin of superior flap tissue\par 10/2016 Adjuvant chest wall radiation at [facility].\par 3/23 She developed a rash with blisters on the right mastectomy flap. She was treated with a 10 day course of Augmentin with improvement.\par 4/3/23 Genetic testing with [employer] gene panel negative for pathogenic variants and VUS.\par 5/1/23 MRI breasts revealed multiple foci of dermal enhancement in the right lateral chest wall corresponding to the area of clinical concern. Innumerable hepatic lesions.\par 5/9/23 Biopsy of chest wall lesion by Dr. Estrada showed no carcinoma and only dermal sclerosis with superficial fibroblasts and scattered telangiectases consistent with radiation dermatitis changes.\par 5/10/23 MRI of the abdomen revealed extensive liver metastases and bone metastases in the lumbar spine, pelvis and proximal femurs as well as a left adrenal lesion, consistent with metastatic disease.\par 5/13/23 MRI brain showed no abnormal enhancement and no abnormal findings\par 5/15/23 Bone scan showed focal increased uptake in the right acetabulum and left iliac wing suggestive of osseous metastases and punctate foci in the left parietal calvarium and left side of T10 vertebra which are nonspecific, but possibly represent additional metastatic foci.\par 05/16/23 US guided Liver biopsy revealed metastatic carcinoma, morphologically and immunophenotypically compatible with breast primary, ER 90%, [place] 80%, HER-2 (1+)\par 5/18/23 Lupron 11.75 mg given and will continue every 3 months\par 5/18/23 Xgeva 120 mg given and will continue  every 3 months\par 5/27/23 Letrozole and Ibrance 125 mg po daily days 1-21 every 28 days\par 5/24/23 PET/CT scan showed FDG avid bone lesions (T9, left iliac wing, right iliac bone, left femur, no cortical defect) and hepatic lesions (scattered with largest 3.7 x 3.4 cm) c/w metastases and multiple subcentimeter lung nodules in a metastatic pattern with a few showing FDG activity.\par  [de-identified] : 2023 IDC, ER 90%, [place] 80%, HER-2 (1+); 2012: DCIS, high grade, ER/[place] >90% [de-identified] : Fatimah started treatment on 5/18 with Lupron and Xgeva. She tolerated both well with some mild hot flashes and no increase in pain.\par She started letrozole and Ibrance on 5/27. She is overall tolerating the medication fairly well with no adverse effects at this time. \par CBC on 6/5/23 showed WBC 3.5 with ANC 2.43 which is expected. CBC on 6/19 showed WBC 1.5 with .\par Tumor markers increased and will continue to monitor\par The lesions on the right reconstructed breast improved on Augmentin and the punch biopsy showed no carcinoma and only dermal sclerosis with superficial fibroblasts and scattered telangiectases consistent with radiation dermatitis changes. However she continues to develop pruritic scattered lesions on her neck and chest and back of unclear etiology. She also developed a rash last week on the palms of her hands which is resolving. Not itchy and not any where else. She has derm appt on 7/3.\par She takes ibuprofen 800 mg as needed for her headaches which occur every few days.\par \par 5/24/23 PET/CT scan showed FDG avid bone lesions (T9, left iliac wing, right iliac bone, left femur, no cortical defect) and hepatic lesions (scattered with largest 3.7 x 3.4 cm) c/w metastases and multiple subcentimeter lung nodules in a metastatic pattern with a few showing FDG activity.\par 5/15/23 Bone scan showed focal increased uptake in the right acetabulum and left iliac wing suggestive of osseous metastases and punctate foci in the left parietal calvarium and left side of T10 vertebra which are nonspecific, but possibly represent additional metastatic foci.\par \par

## 2023-06-29 ENCOUNTER — NON-APPOINTMENT (OUTPATIENT)
Age: 48
End: 2023-06-29

## 2023-06-29 ENCOUNTER — LABORATORY RESULT (OUTPATIENT)
Age: 48
End: 2023-06-29

## 2023-07-03 ENCOUNTER — RESULT REVIEW (OUTPATIENT)
Age: 48
End: 2023-07-03

## 2023-07-03 ENCOUNTER — APPOINTMENT (OUTPATIENT)
Dept: HEMATOLOGY ONCOLOGY | Facility: CLINIC | Age: 48
End: 2023-07-03

## 2023-07-03 ENCOUNTER — APPOINTMENT (OUTPATIENT)
Dept: DERMATOLOGY | Facility: CLINIC | Age: 48
End: 2023-07-03
Payer: COMMERCIAL

## 2023-07-03 DIAGNOSIS — D48.5 NEOPLASM OF UNCERTAIN BEHAVIOR OF SKIN: ICD-10-CM

## 2023-07-03 DIAGNOSIS — L58.9 RADIODERMATITIS, UNSPECIFIED: ICD-10-CM

## 2023-07-03 LAB
BASOPHILS # BLD AUTO: 0.09 K/UL — SIGNIFICANT CHANGE UP (ref 0–0.2)
BASOPHILS NFR BLD AUTO: 3 % — HIGH (ref 0–2)
EOSINOPHIL # BLD AUTO: 0.09 K/UL — SIGNIFICANT CHANGE UP (ref 0–0.5)
EOSINOPHIL NFR BLD AUTO: 3 % — SIGNIFICANT CHANGE UP (ref 0–6)
HCT VFR BLD CALC: 37.3 % — SIGNIFICANT CHANGE UP (ref 34.5–45)
HGB BLD-MCNC: 12.8 G/DL — SIGNIFICANT CHANGE UP (ref 11.5–15.5)
LYMPHOCYTES # BLD AUTO: 1.15 K/UL — SIGNIFICANT CHANGE UP (ref 1–3.3)
LYMPHOCYTES # BLD AUTO: 39 % — SIGNIFICANT CHANGE UP (ref 13–44)
MCHC RBC-ENTMCNC: 28.7 PG — SIGNIFICANT CHANGE UP (ref 27–34)
MCHC RBC-ENTMCNC: 34.3 G/DL — SIGNIFICANT CHANGE UP (ref 32–36)
MCV RBC AUTO: 83.6 FL — SIGNIFICANT CHANGE UP (ref 80–100)
MONOCYTES # BLD AUTO: 0.44 K/UL — SIGNIFICANT CHANGE UP (ref 0–0.9)
MONOCYTES NFR BLD AUTO: 15 % — HIGH (ref 2–14)
NEUTROPHILS # BLD AUTO: 1.18 K/UL — LOW (ref 1.8–7.4)
NEUTROPHILS NFR BLD AUTO: 40 % — LOW (ref 43–77)
NRBC # BLD: 0 /100 — SIGNIFICANT CHANGE UP (ref 0–0)
NRBC # BLD: SIGNIFICANT CHANGE UP /100 WBCS (ref 0–0)
PLAT MORPH BLD: NORMAL — SIGNIFICANT CHANGE UP
PLATELET # BLD AUTO: 293 K/UL — SIGNIFICANT CHANGE UP (ref 150–400)
RBC # BLD: 4.46 M/UL — SIGNIFICANT CHANGE UP (ref 3.8–5.2)
RBC # FLD: 15.3 % — HIGH (ref 10.3–14.5)
RBC BLD AUTO: SIGNIFICANT CHANGE UP
WBC # BLD: 2.94 K/UL — LOW (ref 3.8–10.5)
WBC # FLD AUTO: 2.94 K/UL — LOW (ref 3.8–10.5)

## 2023-07-03 PROCEDURE — 11104 PUNCH BX SKIN SINGLE LESION: CPT

## 2023-07-03 PROCEDURE — 11105 PUNCH BX SKIN EA SEP/ADDL: CPT

## 2023-07-03 PROCEDURE — 99204 OFFICE O/P NEW MOD 45 MIN: CPT | Mod: 25

## 2023-07-03 RX ORDER — CLOBETASOL PROPIONATE 0.5 MG/G
0.05 OINTMENT TOPICAL
Qty: 1 | Refills: 1 | Status: ACTIVE | COMMUNITY
Start: 2023-07-03 | End: 1900-01-01

## 2023-07-03 NOTE — PHYSICAL EXAM
[FreeTextEntry3] : crusted papules and pink papules scattered on the b/l breasts\par \par hemorrhagic papules and crust and collarettes of scale on the extremities\par \par pictures of red papules on the palms\par \par No inguinal, cervical, and axillary LAD

## 2023-07-03 NOTE — ASSESSMENT
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 Innumerable hepatic lesions.\par - 5/9/23 Biopsy of chest wall lesion by Dr. Estrada showed no carcinoma and only dermal sclerosis with superficial fibroblasts and scattered telangiectases consistent with radiation dermatitis changes.\par - Recommend NAC for picking -- 600 to 1000 mg\par \par #Neoplasm of uncertain significance (H&E and DIF)\par - Location: right \par - Ddx: PLEVA v LyP v dermatitis artefacta v a bullous dermatitis\par - Biopsy was performed today for histologic correlation. Will contact pt with results and plan treatment considering histologic findings. \par - Biopsy by punch. The risks/benefits/alternatives of skin biopsy were explained to the patient, which include and are not limited to bleeding, infection, scarring or discoloration of skin, and recurrence of lesion. Patient expressed understanding of these risks and provided consent to the procedure. Time out with verification of patient and lesion site was performed. Site was prepped with rubbing alcohol, lidocaine with epinephrine was injected for anesthesia, and biopsy was performed. Specimen sent to path. Procedure was without complication and well tolerated. I discussed wound care with the patient.\par \par #Hand dermatitis, chemo induced\par Grade 1 today but has been off Ibrance\par - Diagnosis and etiology reviewed. Treatment options discussed.\par - Start clobetasol 0.05% ointment BID to the AA for use up to 2 weeks on, then 1 week off. Repeat as needed. SED

## 2023-07-03 NOTE — HISTORY OF PRESENT ILLNESS
[FreeTextEntry1] : NPV rash [de-identified] : JUNI BARLOW  is a pleasant 48 year old F with PMH significant for Metastatic breast cancer at age 48. She is s/p b/l mastectomy. 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 Located on the b/l breast skin and proximal extremities. Biopsied 2x in the past that was negative for metastasis but positive for dermatitis and fibrosis. \par - Also reports red bumps on the palms - itchy- after Ibrance\par \par \par \par No p/f hx of skin cancer.\par

## 2023-07-12 ENCOUNTER — OUTPATIENT (OUTPATIENT)
Dept: OUTPATIENT SERVICES | Facility: HOSPITAL | Age: 48
LOS: 1 days | Discharge: HOME | End: 2023-07-12

## 2023-07-12 ENCOUNTER — NON-APPOINTMENT (OUTPATIENT)
Age: 48
End: 2023-07-12

## 2023-07-12 DIAGNOSIS — C50.919 MALIGNANT NEOPLASM OF UNSPECIFIED SITE OF UNSPECIFIED FEMALE BREAST: ICD-10-CM

## 2023-07-12 LAB — DERMATOLOGY BIOPSY: NORMAL

## 2023-07-17 ENCOUNTER — RESULT REVIEW (OUTPATIENT)
Age: 48
End: 2023-07-17

## 2023-07-17 ENCOUNTER — APPOINTMENT (OUTPATIENT)
Dept: DERMATOLOGY | Facility: CLINIC | Age: 48
End: 2023-07-17
Payer: COMMERCIAL

## 2023-07-17 ENCOUNTER — APPOINTMENT (OUTPATIENT)
Dept: HEMATOLOGY ONCOLOGY | Facility: CLINIC | Age: 48
End: 2023-07-17

## 2023-07-17 DIAGNOSIS — L81.0 POSTINFLAMMATORY HYPERPIGMENTATION: ICD-10-CM

## 2023-07-17 DIAGNOSIS — R21 RASH AND OTHER NONSPECIFIC SKIN ERUPTION: ICD-10-CM

## 2023-07-17 LAB
BASOPHILS # BLD AUTO: 0.02 K/UL — SIGNIFICANT CHANGE UP (ref 0–0.2)
BASOPHILS NFR BLD AUTO: 0.8 % — SIGNIFICANT CHANGE UP (ref 0–2)
EOSINOPHIL # BLD AUTO: 0.04 K/UL — SIGNIFICANT CHANGE UP (ref 0–0.5)
EOSINOPHIL NFR BLD AUTO: 1.7 % — SIGNIFICANT CHANGE UP (ref 0–6)
HCT VFR BLD CALC: 39.6 % — SIGNIFICANT CHANGE UP (ref 34.5–45)
HGB BLD-MCNC: 13.3 G/DL — SIGNIFICANT CHANGE UP (ref 11.5–15.5)
IMM GRANULOCYTES NFR BLD AUTO: 0.4 % — SIGNIFICANT CHANGE UP (ref 0–0.9)
LYMPHOCYTES # BLD AUTO: 0.92 K/UL — LOW (ref 1–3.3)
LYMPHOCYTES # BLD AUTO: 38.3 % — SIGNIFICANT CHANGE UP (ref 13–44)
MCHC RBC-ENTMCNC: 29.2 PG — SIGNIFICANT CHANGE UP (ref 27–34)
MCHC RBC-ENTMCNC: 33.6 G/DL — SIGNIFICANT CHANGE UP (ref 32–36)
MCV RBC AUTO: 87 FL — SIGNIFICANT CHANGE UP (ref 80–100)
MONOCYTES # BLD AUTO: 0.11 K/UL — SIGNIFICANT CHANGE UP (ref 0–0.9)
MONOCYTES NFR BLD AUTO: 4.6 % — SIGNIFICANT CHANGE UP (ref 2–14)
NEUTROPHILS # BLD AUTO: 1.3 K/UL — LOW (ref 1.8–7.4)
NEUTROPHILS NFR BLD AUTO: 54.2 % — SIGNIFICANT CHANGE UP (ref 43–77)
NRBC # BLD: 0 /100 WBCS — SIGNIFICANT CHANGE UP (ref 0–0)
PLATELET # BLD AUTO: 168 K/UL — SIGNIFICANT CHANGE UP (ref 150–400)
RBC # BLD: 4.55 M/UL — SIGNIFICANT CHANGE UP (ref 3.8–5.2)
RBC # FLD: 16.1 % — HIGH (ref 10.3–14.5)
WBC # BLD: 2.4 K/UL — LOW (ref 3.8–10.5)
WBC # FLD AUTO: 2.4 K/UL — LOW (ref 3.8–10.5)

## 2023-07-17 PROCEDURE — 99214 OFFICE O/P EST MOD 30 MIN: CPT

## 2023-07-18 LAB
ALBUMIN SERPL ELPH-MCNC: 5.2 G/DL
ALP BLD-CCNC: 52 U/L
ALT SERPL-CCNC: 8 U/L
ANION GAP SERPL CALC-SCNC: 18 MMOL/L
AST SERPL-CCNC: 32 U/L
BILIRUB SERPL-MCNC: 0.4 MG/DL
BUN SERPL-MCNC: 12 MG/DL
CALCIUM SERPL-MCNC: 9.5 MG/DL
CANCER AG15-3 SERPL-ACNC: 560 U/ML
CANCER AG27-29 SERPL-ACNC: 415.8 U/ML
CEA SERPL-MCNC: 7.9 NG/ML
CHLORIDE SERPL-SCNC: 98 MMOL/L
CO2 SERPL-SCNC: 24 MMOL/L
CREAT SERPL-MCNC: 0.74 MG/DL
EGFR: 99 ML/MIN/1.73M2
GLUCOSE SERPL-MCNC: 85 MG/DL
POTASSIUM SERPL-SCNC: 4.1 MMOL/L
PROT SERPL-MCNC: 7.5 G/DL
SODIUM SERPL-SCNC: 140 MMOL/L

## 2023-07-19 ENCOUNTER — APPOINTMENT (OUTPATIENT)
Dept: HEMATOLOGY ONCOLOGY | Facility: CLINIC | Age: 48
End: 2023-07-19
Payer: COMMERCIAL

## 2023-07-19 ENCOUNTER — RX RENEWAL (OUTPATIENT)
Age: 48
End: 2023-07-19

## 2023-07-19 VITALS
TEMPERATURE: 97.1 F | WEIGHT: 123.9 LBS | RESPIRATION RATE: 18 BRPM | HEART RATE: 81 BPM | OXYGEN SATURATION: 99 % | BODY MASS INDEX: 21.27 KG/M2 | DIASTOLIC BLOOD PRESSURE: 86 MMHG | SYSTOLIC BLOOD PRESSURE: 124 MMHG

## 2023-07-19 DIAGNOSIS — L30.9 DERMATITIS, UNSPECIFIED: ICD-10-CM

## 2023-07-19 PROCEDURE — 99214 OFFICE O/P EST MOD 30 MIN: CPT

## 2023-07-19 RX ORDER — LETROZOLE TABLETS 2.5 MG/1
2.5 TABLET, FILM COATED ORAL DAILY
Qty: 90 | Refills: 0 | Status: ACTIVE | COMMUNITY
Start: 2023-05-24 | End: 1900-01-01

## 2023-07-19 NOTE — PHYSICAL EXAM
[Fully active, able to carry on all pre-disease performance without restriction] : Status 0 - Fully active, able to carry on all pre-disease performance without restriction [Normal] : affect appropriate [de-identified] : s/p Bilateral mastectomies with implant reconstruction. No chest wall masses or lymphadenopathy. Healing skin lesions with no discrete masses [de-identified] : Scattered isolated erythematous lesions on chest with excoriation, but overall much improved

## 2023-07-31 ENCOUNTER — LABORATORY RESULT (OUTPATIENT)
Age: 48
End: 2023-07-31

## 2023-08-07 ENCOUNTER — RESULT REVIEW (OUTPATIENT)
Age: 48
End: 2023-08-07

## 2023-08-07 ENCOUNTER — APPOINTMENT (OUTPATIENT)
Dept: HEMATOLOGY ONCOLOGY | Facility: CLINIC | Age: 48
End: 2023-08-07

## 2023-08-07 LAB
BASOPHILS # BLD AUTO: 0.05 K/UL — SIGNIFICANT CHANGE UP (ref 0–0.2)
BASOPHILS NFR BLD AUTO: 1.8 % — SIGNIFICANT CHANGE UP (ref 0–2)
EOSINOPHIL # BLD AUTO: 0.04 K/UL — SIGNIFICANT CHANGE UP (ref 0–0.5)
EOSINOPHIL NFR BLD AUTO: 1.5 % — SIGNIFICANT CHANGE UP (ref 0–6)
HCT VFR BLD CALC: 37.3 % — SIGNIFICANT CHANGE UP (ref 34.5–45)
HGB BLD-MCNC: 12.4 G/DL — SIGNIFICANT CHANGE UP (ref 11.5–15.5)
IMM GRANULOCYTES NFR BLD AUTO: 0.4 % — SIGNIFICANT CHANGE UP (ref 0–0.9)
LYMPHOCYTES # BLD AUTO: 1.07 K/UL — SIGNIFICANT CHANGE UP (ref 1–3.3)
LYMPHOCYTES # BLD AUTO: 39.5 % — SIGNIFICANT CHANGE UP (ref 13–44)
MCHC RBC-ENTMCNC: 29.7 PG — SIGNIFICANT CHANGE UP (ref 27–34)
MCHC RBC-ENTMCNC: 33.2 G/DL — SIGNIFICANT CHANGE UP (ref 32–36)
MCV RBC AUTO: 89.4 FL — SIGNIFICANT CHANGE UP (ref 80–100)
MONOCYTES # BLD AUTO: 0.35 K/UL — SIGNIFICANT CHANGE UP (ref 0–0.9)
MONOCYTES NFR BLD AUTO: 12.9 % — SIGNIFICANT CHANGE UP (ref 2–14)
NEUTROPHILS # BLD AUTO: 1.19 K/UL — LOW (ref 1.8–7.4)
NEUTROPHILS NFR BLD AUTO: 43.9 % — SIGNIFICANT CHANGE UP (ref 43–77)
NRBC # BLD: 0 /100 WBCS — SIGNIFICANT CHANGE UP (ref 0–0)
PLATELET # BLD AUTO: 176 K/UL — SIGNIFICANT CHANGE UP (ref 150–400)
RBC # BLD: 4.17 M/UL — SIGNIFICANT CHANGE UP (ref 3.8–5.2)
RBC # FLD: 18.1 % — HIGH (ref 10.3–14.5)
WBC # BLD: 2.71 K/UL — LOW (ref 3.8–10.5)
WBC # FLD AUTO: 2.71 K/UL — LOW (ref 3.8–10.5)

## 2023-08-16 ENCOUNTER — APPOINTMENT (OUTPATIENT)
Dept: HEMATOLOGY ONCOLOGY | Facility: CLINIC | Age: 48
End: 2023-08-16

## 2023-08-16 ENCOUNTER — APPOINTMENT (OUTPATIENT)
Dept: INFUSION THERAPY | Facility: HOSPITAL | Age: 48
End: 2023-08-16

## 2023-08-16 ENCOUNTER — RX RENEWAL (OUTPATIENT)
Age: 48
End: 2023-08-16

## 2023-09-01 ENCOUNTER — OUTPATIENT (OUTPATIENT)
Dept: OUTPATIENT SERVICES | Facility: HOSPITAL | Age: 48
LOS: 1 days | Discharge: HOME | End: 2023-09-01

## 2023-09-01 DIAGNOSIS — C50.919 MALIGNANT NEOPLASM OF UNSPECIFIED SITE OF UNSPECIFIED FEMALE BREAST: ICD-10-CM

## 2023-09-13 ENCOUNTER — APPOINTMENT (OUTPATIENT)
Dept: HEMATOLOGY ONCOLOGY | Facility: CLINIC | Age: 48
End: 2023-09-13

## 2023-09-18 ENCOUNTER — LABORATORY RESULT (OUTPATIENT)
Age: 48
End: 2023-09-18

## 2023-09-18 ENCOUNTER — APPOINTMENT (OUTPATIENT)
Dept: DERMATOLOGY | Facility: CLINIC | Age: 48
End: 2023-09-18
Payer: COMMERCIAL

## 2023-09-18 DIAGNOSIS — L70.0 ACNE VULGARIS: ICD-10-CM

## 2023-09-18 DIAGNOSIS — L29.9 PRURITUS, UNSPECIFIED: ICD-10-CM

## 2023-09-18 PROCEDURE — 99214 OFFICE O/P EST MOD 30 MIN: CPT

## 2023-09-18 RX ORDER — BENZOYL PEROXIDE 5 G/100G
5 LIQUID TOPICAL
Qty: 1 | Refills: 6 | Status: ACTIVE | COMMUNITY
Start: 2023-09-18 | End: 1900-01-01

## 2023-09-18 RX ORDER — GABAPENTIN 100 MG/1
100 CAPSULE ORAL
Qty: 180 | Refills: 0 | Status: ACTIVE | COMMUNITY
Start: 2023-09-18 | End: 1900-01-01

## 2023-10-26 NOTE — HISTORY OF PRESENT ILLNESS
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 Carla William at [facility] revealed DCIS, intermediate to high nuclear grade, with DCIS present <0.1 cm from the deep margin, 0/3 left SLN and 0/1 right SLN. She had immediate implant based reconstruction by Dr. Stratton.\par 9/8/16 Resection of Right chest wall focal high grade DCIS (2 and 4 mm) recurrence with negative margins, although <0.1 cm from inked margin of superior flap tissue\par 10/2016 Adjuvant chest wall radiation at [facility].\par 3/23 She developed a rash with blisters on the right mastectomy flap. She was treated with a 10 day course of Augmentin with improvement.\par 4/3/23 Genetic testing with [facility] gene panel negative for pathogenic variants and VUS.\par 5/1/23 MRI breasts revealed multiple foci of dermal enhancement in the right lateral chest wall corresponding to the area of clinical concern. Innumerable hepatic lesions.\par 5/9/23 Biopsy of chest wall lesion by Dr. Estrada showed no carcinoma and only dermal sclerosis with superficial fibroblasts and scattered telangiectases consistent with radiation dermatitis changes.\par 5/10/23 MRI of the abdomen revealed extensive liver metastases and bone metastases in the lumbar spine, pelvis and proximal femurs as well as a left adrenal lesion, consistent with metastatic disease.\par 5/13/23 MRI brain showed no abnormal enhancement and no abnormal findings\par 5/15/23 Bone scan showed focal increased uptake in the right acetabulum and left iliac wing suggestive of osseous metastases and punctate foci in the left parietal calvarium and left side of T10 vertebra which are nonspecific, but possibly represent additional metastatic foci.\par 05/16/23 US guided Liver biopsy revealed metastatic carcinoma, morphologically and immunophenotypically compatible with breast primary, ER 90%, KS 80%, HER-2 (1+)\par 5/18/23 Lupron 11.75 mg given and will continue every 3 months\par 5/18/23 Xgeva 120 mg given and will continue  every 3 months\par 5/27/23 Letrozole and Ibrance 125 mg po daily days 1-21 every 28 days\par 5/24/23 PET/CT scan showed FDG avid bone lesions (T9, left iliac wing, right iliac bone, left femur, no cortical defect) and hepatic lesions (scattered with largest 3.7 x 3.4 cm) c/w metastases and multiple subcentimeter lung nodules in a metastatic pattern with a few showing FDG activity.\par  [de-identified] : 2023 IDC, ER 90%, MI 80%, HER-2 (1+); 2012: DCIS, high grade, ER/MI >90% [de-identified] : Fatimah started treatment on 5/18 with Lupron and Xgeva. She tolerated both well with some mild hot flashes and no increase in pain.\par She started letrozole and Ibrance on 5/27. She is overall tolerating the medications fairly well with no adverse effects at this time. \par CBC on 6/19 showed WBC 1.5 with . On 6/29 ANC was 990 and Ibrance was held for a few days and ANC rechecked 7/3/23 and was 1180.\par She resumed on 7/3/23 and is now C2 D17. \par Tumor markers have continued to rise and discussed getting a PET/CT scan before completing C3 and committing to C4 Ibrance.\par The lesions on the right reconstructed breast improved on Augmentin and the punch biopsy showed no carcinoma and only dermal sclerosis with superficial fibroblasts and scattered telangiectases consistent with radiation dermatitis changes. However she continues to develop pruritic scattered lesions on her neck and chest and back of unclear etiology. She also developed a rash last week on the palms of her hands which is resolving. Not itchy and not any where else. She was seen by dermatology who think this is chemo induced dermatitis.\par She takes ibuprofen 800 mg as needed for her headaches which occur every few days.\par \par 5/24/23 PET/CT scan showed FDG avid bone lesions (T9, left iliac wing, right iliac bone, left femur, no cortical defect) and hepatic lesions (scattered with largest 3.7 x 3.4 cm) c/w metastases and multiple subcentimeter lung nodules in a metastatic pattern with a few showing FDG activity.\par 5/15/23 Bone scan showed focal increased uptake in the right acetabulum and left iliac wing suggestive of osseous metastases and punctate foci in the left parietal calvarium and left side of T10 vertebra which are nonspecific, but possibly represent additional metastatic foci.\par \par

## 2023-11-06 ENCOUNTER — APPOINTMENT (OUTPATIENT)
Dept: DERMATOLOGY | Facility: CLINIC | Age: 48
End: 2023-11-06

## 2023-11-20 ENCOUNTER — RX RENEWAL (OUTPATIENT)
Age: 48
End: 2023-11-20

## 2023-12-18 ENCOUNTER — APPOINTMENT (OUTPATIENT)
Dept: OPHTHALMOLOGY | Facility: CLINIC | Age: 48
End: 2023-12-18
Payer: COMMERCIAL

## 2023-12-18 ENCOUNTER — NON-APPOINTMENT (OUTPATIENT)
Age: 48
End: 2023-12-18

## 2023-12-18 ENCOUNTER — APPOINTMENT (OUTPATIENT)
Dept: OPHTHALMOLOGY | Facility: CLINIC | Age: 48
End: 2023-12-18
Payer: SELF-PAY

## 2023-12-18 PROCEDURE — 92020 GONIOSCOPY: CPT

## 2023-12-18 PROCEDURE — 92015 DETERMINE REFRACTIVE STATE: CPT

## 2023-12-18 PROCEDURE — 92004 COMPRE OPH EXAM NEW PT 1/>: CPT

## 2024-06-19 ENCOUNTER — APPOINTMENT (OUTPATIENT)
Dept: GASTROENTEROLOGY | Facility: CLINIC | Age: 49
End: 2024-06-19
Payer: COMMERCIAL

## 2024-06-19 VITALS
WEIGHT: 112 LBS | DIASTOLIC BLOOD PRESSURE: 68 MMHG | BODY MASS INDEX: 19.12 KG/M2 | SYSTOLIC BLOOD PRESSURE: 108 MMHG | HEIGHT: 64 IN

## 2024-06-19 DIAGNOSIS — Z12.11 ENCOUNTER FOR SCREENING FOR MALIGNANT NEOPLASM OF COLON: ICD-10-CM

## 2024-06-19 DIAGNOSIS — C50.919 MALIGNANT NEOPLASM OF UNSPECIFIED SITE OF UNSPECIFIED FEMALE BREAST: ICD-10-CM

## 2024-06-19 PROCEDURE — 99203 OFFICE O/P NEW LOW 30 MIN: CPT

## 2024-06-19 RX ORDER — SODIUM SULFATE, POTASSIUM SULFATE AND MAGNESIUM SULFATE 1.6; 3.13; 17.5 G/177ML; G/177ML; G/177ML
17.5-3.13-1.6 SOLUTION ORAL
Qty: 1 | Refills: 0 | Status: ACTIVE | COMMUNITY
Start: 2024-06-19 | End: 1900-01-01

## 2024-06-19 NOTE — REASON FOR VISIT
[Consultation] : a consultation visit [FreeTextEntry1] : screening colonoscopy Pt has rolling walker at home.

## 2024-06-19 NOTE — HISTORY OF PRESENT ILLNESS
[FreeTextEntry1] : Ms. JUNI BARLOW is a 49 year old female presents for screening colonoscopy. Patient has no complaints of bowel issues, bleeding, abdominal pain, family history of colon cancer, GERD symptoms.  Patient never had prior screening.  Patient is has a history of metastatic breast cancer.

## 2024-06-19 NOTE — PHYSICAL EXAM
[Alert] : alert [Normal Voice/Communication] : normal voice/communication [Healthy Appearing] : healthy appearing [No Acute Distress] : no acute distress [Sclera] : the sclera and conjunctiva were normal [Hearing Threshold Finger Rub Not [place]] : hearing was normal [Normal Lips/Gums] : the lips and gums were normal [Oropharynx] : the oropharynx was normal [Normal Appearance] : the appearance of the neck was normal [No Neck Mass] : no neck mass was observed [No Respiratory Distress] : no respiratory distress [No Acc Muscle Use] : no accessory muscle use [Respiration, Rhythm And Depth] : normal respiratory rhythm and effort [Auscultation Breath Sounds / Voice Sounds] : lungs were clear to auscultation bilaterally [Heart Rate And Rhythm] : heart rate was normal and rhythm regular [Normal S1, S2] : normal S1 and S2 [Murmurs] : no murmurs [Bowel Sounds] : normal bowel sounds [Abdomen Tenderness] : non-tender [No Masses] : no abdominal mass palpated [Abdomen Soft] : soft [] : no hepatosplenomegaly [Oriented To Time, Place, And Person] : oriented to person, place, and time

## 2025-05-01 ENCOUNTER — APPOINTMENT (OUTPATIENT)
Dept: GASTROENTEROLOGY | Facility: AMBULATORY MEDICAL SERVICES | Age: 50
End: 2025-05-01

## 2025-06-02 ENCOUNTER — OFFICE (OUTPATIENT)
Facility: LOCATION | Age: 50
Setting detail: OPHTHALMOLOGY
End: 2025-06-02
Payer: COMMERCIAL

## 2025-06-02 DIAGNOSIS — H02.834: ICD-10-CM

## 2025-06-02 DIAGNOSIS — H31.29: ICD-10-CM

## 2025-06-02 DIAGNOSIS — H52.4: ICD-10-CM

## 2025-06-02 DIAGNOSIS — H16.223: ICD-10-CM

## 2025-06-02 DIAGNOSIS — H25.13: ICD-10-CM

## 2025-06-02 DIAGNOSIS — H02.831: ICD-10-CM

## 2025-06-02 PROBLEM — H52.03 HYPEROPIA; BOTH EYES: Status: ACTIVE | Noted: 2025-06-02

## 2025-06-02 PROCEDURE — 92004 COMPRE OPH EXAM NEW PT 1/>: CPT | Performed by: OPHTHALMOLOGY

## 2025-06-02 PROCEDURE — 92250 FUNDUS PHOTOGRAPHY W/I&R: CPT | Performed by: OPHTHALMOLOGY

## 2025-06-02 PROCEDURE — 92015 DETERMINE REFRACTIVE STATE: CPT | Performed by: OPHTHALMOLOGY

## 2025-06-02 ASSESSMENT — REFRACTION_AUTOREFRACTION
OD_SPHERE: +0.25
OS_SPHERE: +1.00
OS_AXIS: 002
OD_AXIS: 175
OS_CYLINDER: +1.00
OD_CYLINDER: +1.75

## 2025-06-02 ASSESSMENT — REFRACTION_CURRENTRX
OS_OVR_VA: 20/
OD_ADD: +1.50
OD_OVR_VA: 20/
OS_CYLINDER: +0.50
OS_SPHERE: +0.75
OS_AXIS: 152
OD_SPHERE: +0.75
OD_AXIS: 177
OD_CYLINDER: +0.50
OS_ADD: +1.50

## 2025-06-02 ASSESSMENT — KERATOMETRY
OS_K2POWER_DIOPTERS: 45.75
OS_K1POWER_DIOPTERS: 45.00
OD_K1POWER_DIOPTERS: 45.50
OS_AXISANGLE_DEGREES: 099
OD_K2POWER_DIOPTERS: 46.00
OD_AXISANGLE_DEGREES: 062

## 2025-06-02 ASSESSMENT — REFRACTION_TRIALFRAME
OD_AXIS: 175
OS_CYLINDER: +1.00
OS_AXIS: 150
OS_VA2: 20/20(J1+)
OD_ADD: +1.50
OS_VA1: 20/20
OD_VA1: 20/20
OS_ADD: +1.50
OD_VA2: 20/20(J1+)
OD_CYLINDER: +1.25
OS_SPHERE: +1.00
OU_VA: 20/20
OD_SPHERE: +0.50

## 2025-06-02 ASSESSMENT — TONOMETRY
OD_IOP_MMHG: 14
OS_IOP_MMHG: 14

## 2025-06-02 ASSESSMENT — LID POSITION - DERMATOCHALASIS
OS_DERMATOCHALASIS: 1+
OD_DERMATOCHALASIS: 1+

## 2025-06-02 ASSESSMENT — SUPERFICIAL PUNCTATE KERATITIS (SPK)
OS_SPK: T
OD_SPK: T

## 2025-06-02 ASSESSMENT — VISUAL ACUITY
OS_BCVA: 20/20
OD_BCVA: 20/20

## 2025-06-02 ASSESSMENT — CONFRONTATIONAL VISUAL FIELD TEST (CVF)
OS_FINDINGS: FULL
OD_FINDINGS: FULL